# Patient Record
Sex: FEMALE
[De-identification: names, ages, dates, MRNs, and addresses within clinical notes are randomized per-mention and may not be internally consistent; named-entity substitution may affect disease eponyms.]

---

## 2020-04-09 ENCOUNTER — APPOINTMENT (OUTPATIENT)
Dept: DISASTER EMERGENCY | Facility: CLINIC | Age: 50
End: 2020-04-09
Payer: COMMERCIAL

## 2020-04-09 VITALS
TEMPERATURE: 99.6 F | HEART RATE: 89 BPM | OXYGEN SATURATION: 96 % | SYSTOLIC BLOOD PRESSURE: 120 MMHG | DIASTOLIC BLOOD PRESSURE: 70 MMHG | RESPIRATION RATE: 16 BRPM

## 2020-04-09 DIAGNOSIS — Z20.828 CONTACT WITH AND (SUSPECTED) EXPOSURE TO OTHER VIRAL COMMUNICABLE DISEASES: ICD-10-CM

## 2020-04-09 PROBLEM — Z00.00 ENCOUNTER FOR PREVENTIVE HEALTH EXAMINATION: Status: ACTIVE | Noted: 2020-04-09

## 2020-04-09 PROCEDURE — 99213 OFFICE O/P EST LOW 20 MIN: CPT

## 2020-04-09 NOTE — HISTORY OF PRESENT ILLNESS
[Patient presents to the office today for COVID-19 evaluation and testing.] : Patient presents to the office today for COVID-19 evaluation and testing. [Patient has been pre-screened by RN at call center for appointment today with our facility.] : Patient has been pre-screened by RN at call center for appointment today with our facility. [] : no dizziness on standing [With Confirmed Case] : patient exposed to a confirmed case of COVID-19 [Lung Disease] : lung disease [None] : none [Clear] : clear [Good Air Entry] : good air entry [Normal O2 sat at rest] : normal O2 sat at rest [Grossly normal, interacts, not tired or weak] : grossly normal, interacts, not tired or weak [COVID-19 testing ordered and specimen obtained] : COVID-19 testing ordered and specimen obtained [Discharged with current Quarantine instructions and advised of signs of worsening illness.] : Patient discharged with current quarantine instructions and advised of signs of worsening illness. Patient told to seek emergent care if symptoms occur. [FreeTextEntry1] : 49 yr old female pt c/o fever (max:101), cough (dry) Body aches, h/a, sore throat, x 4 days \par States was exposed to corona virus\par Pt has been isolated at home since\par taking meds to help symptoms\par PMH: Asthma

## 2020-04-10 LAB — SARS-COV-2 N GENE NPH QL NAA+PROBE: DETECTED

## 2020-04-13 ENCOUNTER — TRANSCRIPTION ENCOUNTER (OUTPATIENT)
Age: 50
End: 2020-04-13

## 2020-10-31 ENCOUNTER — TRANSCRIPTION ENCOUNTER (OUTPATIENT)
Age: 50
End: 2020-10-31

## 2021-06-09 ENCOUNTER — APPOINTMENT (OUTPATIENT)
Dept: ORTHOPEDIC SURGERY | Facility: CLINIC | Age: 51
End: 2021-06-09
Payer: COMMERCIAL

## 2021-06-09 VITALS
WEIGHT: 224 LBS | HEIGHT: 67 IN | OXYGEN SATURATION: 97 % | SYSTOLIC BLOOD PRESSURE: 135 MMHG | HEART RATE: 105 BPM | BODY MASS INDEX: 35.16 KG/M2 | DIASTOLIC BLOOD PRESSURE: 91 MMHG

## 2021-06-09 DIAGNOSIS — M25.562 PAIN IN LEFT KNEE: ICD-10-CM

## 2021-06-09 PROCEDURE — 20611 DRAIN/INJ JOINT/BURSA W/US: CPT | Mod: LT

## 2021-06-09 PROCEDURE — 99072 ADDL SUPL MATRL&STAF TM PHE: CPT

## 2021-06-09 PROCEDURE — 73564 X-RAY EXAM KNEE 4 OR MORE: CPT | Mod: LT

## 2021-06-09 PROCEDURE — 99204 OFFICE O/P NEW MOD 45 MIN: CPT | Mod: 25

## 2021-06-09 RX ORDER — HYALURONATE SODIUM, STABILIZED 88 MG/4 ML
88 SYRINGE (ML) INTRAARTICULAR
Qty: 1 | Refills: 0 | Status: ACTIVE | COMMUNITY
Start: 2021-06-09 | End: 1900-01-01

## 2021-06-17 ENCOUNTER — APPOINTMENT (OUTPATIENT)
Dept: ORTHOPEDIC SURGERY | Facility: CLINIC | Age: 51
End: 2021-06-17
Payer: COMMERCIAL

## 2021-06-17 DIAGNOSIS — M17.12 UNILATERAL PRIMARY OSTEOARTHRITIS, LEFT KNEE: ICD-10-CM

## 2021-06-17 PROCEDURE — 99072 ADDL SUPL MATRL&STAF TM PHE: CPT

## 2021-06-17 PROCEDURE — 20611 DRAIN/INJ JOINT/BURSA W/US: CPT | Mod: LT

## 2021-06-20 PROBLEM — M17.12 LOCALIZED OSTEOARTHRITIS OF LEFT KNEE: Status: ACTIVE | Noted: 2021-06-09

## 2021-07-11 ENCOUNTER — TRANSCRIPTION ENCOUNTER (OUTPATIENT)
Age: 51
End: 2021-07-11

## 2025-05-18 ENCOUNTER — NON-APPOINTMENT (OUTPATIENT)
Age: 55
End: 2025-05-18